# Patient Record
Sex: MALE | Race: WHITE | Employment: FULL TIME | URBAN - METROPOLITAN AREA
[De-identification: names, ages, dates, MRNs, and addresses within clinical notes are randomized per-mention and may not be internally consistent; named-entity substitution may affect disease eponyms.]

---

## 2022-01-18 ENCOUNTER — OFFICE VISIT (OUTPATIENT)
Dept: SURGERY | Age: 47
End: 2022-01-18

## 2022-01-18 VITALS
SYSTOLIC BLOOD PRESSURE: 140 MMHG | HEIGHT: 68 IN | HEART RATE: 85 BPM | TEMPERATURE: 99.1 F | DIASTOLIC BLOOD PRESSURE: 84 MMHG | WEIGHT: 188 LBS | BODY MASS INDEX: 28.49 KG/M2

## 2022-01-18 DIAGNOSIS — Z87.19 HISTORY OF GASTRITIS: ICD-10-CM

## 2022-01-18 DIAGNOSIS — Z86.010 HISTORY OF COLON POLYPS: ICD-10-CM

## 2022-01-18 DIAGNOSIS — Z12.11 ENCOUNTER FOR SCREENING COLONOSCOPY: Primary | ICD-10-CM

## 2022-01-18 PROCEDURE — 99202 OFFICE O/P NEW SF 15 MIN: CPT | Performed by: SURGERY

## 2022-01-18 RX ORDER — SODIUM, POTASSIUM,MAG SULFATES 17.5-3.13G
1 SOLUTION, RECONSTITUTED, ORAL ORAL ONCE
Qty: 1 EACH | Refills: 0 | Status: SHIPPED | OUTPATIENT
Start: 2022-01-18 | End: 2022-01-18

## 2022-02-05 ASSESSMENT — ENCOUNTER SYMPTOMS
VOMITING: 0
SHORTNESS OF BREATH: 0
ABDOMINAL PAIN: 0
BACK PAIN: 0
NAUSEA: 0
SORE THROAT: 0
TROUBLE SWALLOWING: 0
COUGH: 0
BLOOD IN STOOL: 0
CHOKING: 0

## 2022-02-06 NOTE — PROGRESS NOTES
Teri Zimmerman MD  General Surgery, Endoscopy  Chief Medical Officer    [facility] Breana Ji [address] [address]  Dept: [phone]  Fax: 90 [address]  Chief Complaint   Patient presents with    Colonoscopy     Patient had colonoscopy done 5 years ago at this hospital. Polyps found. Denies family hx of colon ca. Denies rectal bleeding, blood in stool, constipation or diarrhea. HPI     is a pleasant 64-year-old white male presenting for colonoscopy. March 2016 EGD and colonoscopy showing mild chronic gastritis, adenomatous polypectomies of the hepatic flexure, transverse and sigmoid colon, with tubulovillous rectal polypectomy. He has no significant GI complaints at this time. Reflux symptoms are self-limited, not requiring medication. Bowel movements are daily, formed, brown. Normal appetite. No recent weight changes, 188 pounds, BMI 29. No cough, fever nor other respiratory symptoms. No history of COVID-19, no vaccination. No family history of colon cancer nor polyps to his knowledge. Patient quit tobacco 2015. Review of Systems   Constitutional: Negative for activity change, appetite change, chills, fever and unexpected weight change. HENT: Negative for nosebleeds, sneezing, sore throat and trouble swallowing. Eyes: Negative for visual disturbance. Respiratory: Negative for cough, choking and shortness of breath. Cardiovascular: Negative for chest pain, palpitations and leg swelling. Gastrointestinal: Negative for abdominal pain, blood in stool, nausea and vomiting. Genitourinary: Negative for dysuria, flank pain and hematuria. Musculoskeletal: Positive for arthralgias. Negative for back pain, gait problem and myalgias. Allergic/Immunologic: Negative for immunocompromised state. Neurological: Negative for dizziness, seizures, syncope, weakness and headaches. Hematological: Does not bruise/bleed easily. Psychiatric/Behavioral: Negative for confusion and sleep disturbance. Past Medical History:   Diagnosis Date    DDD (degenerative disc disease), cervical     GERD (gastroesophageal reflux disease)     Hyperlipidemia     Osteoarthritis        Past Surgical History:   Procedure Laterality Date    COLON SURGERY      COLONOSCOPY  3/1/2016    LITHOTRIPSY      NECK SURGERY      UPPER GASTROINTESTINAL ENDOSCOPY      UPPER GASTROINTESTINAL ENDOSCOPY  3/1/2016    VASECTOMY         Family History   Problem Relation Age of Onset    High Cholesterol Father     Heart Disease Father     Diabetes Father     High Blood Pressure Father     Emphysema Mother     Other Mother         scleraderma    Osteoarthritis Mother     Heart Disease Brother     Heart Disease Brother        Allergies:  See list    Current Outpatient Medications   Medication Sig Dispense Refill    atorvastatin (LIPITOR) 20 MG tablet Take 1 tablet by mouth daily 90 tablet 3     No current facility-administered medications for this visit. Social History     Socioeconomic History    Marital status:      Spouse name: None    Number of children: None    Years of education: None    Highest education level: None   Occupational History    None   Tobacco Use    Smoking status: Former Smoker     Packs/day: 0.25     Years: 24.00     Pack years: 6.00     Types: Cigarettes    Smokeless tobacco: Never Used   Substance and Sexual Activity    Alcohol use:  Yes     Alcohol/week: 2.0 standard drinks     Types: 2 Cans of beer per week     Comment: beer    Drug use: No    Sexual activity: None   Other Topics Concern    None   Social History Narrative    None     Social Determinants of Health     Financial Resource Strain: Low Risk     Difficulty of Paying Living Expenses: Not hard at all   Food Insecurity: No Food Insecurity    Worried About Running Out of Food in the Last Year: Never true    Kristel of Food in the Last Year: Never true   Transportation Needs: No Transportation Needs    Lack of Transportation (Medical): No    Lack of Transportation (Non-Medical): No   Physical Activity: Insufficiently Active    Days of Exercise per Week: 3 days    Minutes of Exercise per Session: 30 min   Stress:     Feeling of Stress : Not on file   Social Connections:     Frequency of Communication with Friends and Family: Not on file    Frequency of Social Gatherings with Friends and Family: Not on file    Attends [employer] Services: Not on file    Active Member of Clubs or Organizations: Not on file    Attends Club or Organization Meetings: Not on file    Marital Status: Not on file   Intimate Partner Violence:     Fear of Current or Ex-Partner: Not on file    Emotionally Abused: Not on file    Physically Abused: Not on file    Sexually Abused: Not on file   Housing Stability:     Unable to Pay for Housing in the Last Year: Not on file    Number of [place] in the Last Year: Not on file    Unstable Housing in the Last Year: Not on file       BP (!) 140/84   Pulse 85   Temp 99.1 °F (37.3 °C)   Ht 5' 7.99\" (1.727 m)   Wt 188 lb (85.3 kg)   BMI 28.59 kg/m²      Physical Exam  Vitals and nursing note reviewed. Constitutional:       Appearance: He is well-developed. HENT:      Head: Normocephalic and atraumatic. Eyes:      General: No scleral icterus. Conjunctiva/sclera: Conjunctivae normal.      Pupils: Pupils are equal, round, and reactive to light. Neck:      Vascular: No JVD. Trachea: No tracheal deviation. Cardiovascular:      Rate and Rhythm: Normal rate and regular rhythm. Pulmonary:      Effort: Pulmonary effort is normal. No respiratory distress. Chest:      Chest wall: No tenderness. Abdominal:      General: There is no distension. Palpations: Abdomen is soft. There is no mass. Tenderness: There is no abdominal tenderness. There is no guarding or rebound.    Musculoskeletal: General: Normal range of motion. Cervical back: Normal range of motion and neck supple. Lymphadenopathy:      Cervical: No cervical adenopathy. Skin:     General: Skin is warm and dry. Findings: No erythema or rash. Neurological:      Mental Status: He is alert and oriented to person, place, and time. Cranial Nerves: No cranial nerve deficit. Psychiatric:         Behavior: Behavior normal.         Thought Content: Thought content normal.         Judgment: Judgment normal.         IMAGING/LABS    Surgical Pathology  Order: [id number]   Status: Final result     Visible to patient: Yes (seen)     Next appt: 11/04/2022 at 03:15 PM in Internal Medicine (Gina Mohamud, APRN - CNP)     0 Result Notes    Component 3/1/16 0932   Surgical Pathology Report (NOTE)   [id number]   [address], [facility] [address] [place], [address]   [phone]   Fax: [phone]     [facility]     Patient Name: Esterllita Palafox Med Rec: [id number]   Path Number: [id number]   Collected: 3/1/2016   Received: 3/2/2016   Reported: 3/3/2016 16:17     -- Diagnosis --   1.  STOMACH, ANTRUM, BIOPSIES:       -  MINIMAL CHRONIC INFLAMMATION.     -  NEGATIVE FOR ACTIVE GASTRITIS, INTESTINAL METAPLASIA OR   DYSPLASIA. 2.  TERMINAL ILEUM, BIOPSIES:       -  NORMAL COLONIC MUCOSA.             -  SMALL BOWEL MUCOSA NOT IDENTIFIED.       3.  COLON, HEPATIC FLEXURE, BIOPSIES:       -  ADENOMATOUS POLYP. 4.  TRANSVERSE COLON, BIOPSIES:       -  PORTIONS OF ADENOMATOUS POLYP AND SUBMUCOSAL ADIPOSE TISSUE,          CONSISTENT WITH LIPOMA. 5.  SIGMOID COLON, BIOPSIES:       -  PORTIONS OF ADENOMATOUS POLYP. 6.  RANDOM COLON, BIOPSIES:       -  NORMAL COLONIC MUCOSA.      7.  RECTUM, POLYPECTOMY AND BIOPSY:       -  TUBULOVILLOUS ADENOMA, MARGINS APPEAR NEGATIVE FOR DYSPLASIA.       -  SEPARATE FRAGMENT OF NORMAL COLONIC MUCOSA.         Franny Zacarias. Quan Harding M.D.   **Electronically Signed Out**         als/3/3/2016       Clinical Information   Pre-op Diagnosis:  ABD PAIN, EPIGASTRIC PAIN, LLQ PAIN, BLACK TARRY   STOOLS     Operative Findings:  ANTRAL BX; TERMINAL ILEUM BIOPSIES; HEPATIC   FLEXURE POLYP; TRANSVERSE COLON POLYP; SIGMOID COLON POLYP; RANDOM   COLON BIOPSIES; RECTAL POLYP x 2   Operation Performed:  EGD WITH BIOPSY; COLONOSCOPY WITH POLYPECTOMY ET   BIOPSY     Source of Specimen   1: ANTRAL BX   2: TERMINAL ILEUM BIOPSY   3: HEPATIC FLEXURE POLYP   4: TRANSVERSE COLON POLYP   5: SIGMOID COLON POLYP X2   6: RANDOM COLON BIOPSIES   7: RECTAL POLYP X2     Gross Description   1.  \"FRANTZ IRWIN, ANTRAL BX\" Two tan to tan-brown tissue fragments   measuring 0.2 and 0.5 cm and measures in aggregate 0.7 x 0.2 x 0.1 cm. Entirely submitted in 1cs.     2.  \"FRANTZ IRWIN, TERMINAL ILEUM BX\" Two pale tan tissue fragments   measuring 0.5 and 0.7 cm and measures in aggregate 1.2 x 0.4 x < 0.1   cm.  Entirely submitted in 1cs. 3.  \"FRANTZ IRWIN, HEPATIC FLEXURE POLYP\" Single tan to pink-tan tissue   fragment measuring 0.4 x 0.3 x 0.2 cm.  Entirely submitted in 1cs. 4.  \"FRANTZ IRWIN, TRANSVERSE COLON POLYP\" Multiple tan to pink-tan   tissue fragments ranging from 0.1 to 0.5 cm and measures in aggregate   2.0 x 0.8 x 0.2 cm.  Entirely submitted in 1cs. 5.  \"FRANTZ PARDOH, SIGMOID COLON POLYP x 2\" Five pale tan to tan tissue   fragments ranging from 0.3 to 0.9 cm and measures in aggregate 2.5 x   0.2 x 0.1 cm.  Entirely submitted in 1cs. 6.  \"FRANTZ IRWIN, RANDOM COLON BIOPSIES\" Three pale to yellow-tan   tissue fragments measuring from 0.3 to 0.6 cm and measures in   aggregate 1.4 x 0.2 x < 0.1 cm.  Entirely submitted in 1cs. 7.  \"FRANTZ PARDOH, RECTAL POLYP\" Two tan to tan-brown tissue fragments   measuring 0.3 x 0.2 x 0.2 cm and the other is pedunculated and has a   stalk and measures 1.3 x 0.8 x 0.5 cm, inked black, bisected.    Entirely submitted in 1cs.  yr tm       Microscopic Description   1. Sections of gastric mucosa show lamina propria with minimal   lymphocytic inflammation. Doug Conradi is no evidence of acute inflammation,   intestinal metaplasia or dysplasia. Doug Conradi is no evidence of organisms   suspicious for Helicobacter on the routine H&E stain. 2. Sections of colon mucosa show linear arrangement of the colonic   crypts and the usual lymphocytes and plasma cells. Doug Conradi is no   evidence of crypt distortion, acute inflammation, dysplasia or   malignancy.  Small bowel mucosa is not identified. 3. Sections of colonic mucosa show crowded tubular shaped glands lined   by hyperchromatic columnar epithelial cells. Doug Conradi is no evidence of   high grade dysplasia or invasive malignancy.     4. Sections of colonic mucosa show crowded tubular shaped glands lined   by hyperchromatic columnar epithelial cells. Doug Conradi is no evidence of   high grade dysplasia or invasive malignancy.  Some fragments also   contain submucosal adipose tissue. 5. Sections of colonic mucosa show crowded tubular shaped glands lined   by hyperchromatic columnar epithelial cells. Doug Conradi is no evidence of   high grade dysplasia or invasive malignancy.     6. Sections of colon mucosa show linear arrangement of the colonic   crypts and the usual lymphocytes and plasma cells.  There is no   evidence of crypt distortion, acute inflammation, dysplasia or   malignancy.     7. Sections of colonic mucosa show crowded tubular and villous shaped   glands lined by hyperchromatic columnar epithelial cells. Doug Conradi is no   evidence of high grade dysplasia or invasive malignancy.  The largest   polyp shows the cauterized margin to be negative for dysplasia.  A   separate fragment shows normal-appearing colonic mucosa.     TR-0               ASSESSMENT     Diagnosis Orders   1. Encounter for screening colonoscopy     2. History of colon polyps     3. History of gastritis     4.  BMI 28.0-28.9,adult         PLAN    Discussed at length with  his history of multiple colon polyps in 2016 with tubulovillous rectal polypectomy, etc.  No GI complaints at this time. We will proceed with colonoscopy.   Risks, benefits, alternatives thoroughly reviewed and accepted by , including GI bleeding, perforation, missed lesions, COVID-19 exposure/infection, etc.  Discussed importance of a healthy, balanced high-fiber low-fat diet with fiber supplementation, increased water intake and physical activity, decrease calories and sugar, etc.     Roseann Cook MD

## 2022-02-06 NOTE — PATIENT INSTRUCTIONS
Patient Education        Learning About Colonoscopy  What is a colonoscopy? A colonoscopy is a test (also called a procedure) that lets a doctor look inside your large intestine. The doctor uses a thin, lighted tube called a colonoscope. The doctor uses it to look for small growths called polyps, colon or rectal cancer (colorectal cancer), or other problems like bleeding. During the procedure, the doctor can take samples of tissue. The samples can then be checked for cancer or other conditions. The doctor can also take out polyps. How is a colonoscopy done? This procedure is done in a doctor's office or a clinic or hospital. You will get medicine to help you relax and not feel pain. Some people find that they don't remember having the test because of the medicine. The doctor gently moves the colonoscope, or scope, through the colon. The scope is also a small video camera. It lets the doctor see the colon and take pictures. How do you prepare for the procedure? You need to clean out your colon before the procedure so the doctor can see your colon. This depends on which \"colon prep\" your doctor recommends. To clean out your colon, you'll do a \"colon prep\" before the test. This means you stop eating solid foods and drink only clear liquids. You can have water, tea, coffee, clear juices, clear broths, flavored ice pops, and gelatin (such as Jell-O). Do not drink anything red or purple. The day or night before the procedure, you drink a large amount of a special liquid. This causes loose, frequent stools. You will go to the bathroom a lot. Your doctor may have you drink part of the liquid the evening before and the rest on the day of the test. It's very important to drink all of the liquid. If you have problems drinking it, call your doctor. Arrange to have someone take you home after the test.  What can you expect after a colonoscopy?   Your doctor will tell you when you can eat and do your usual activities. Drink a lot of fluid after the test to replace the fluids you may have lost during the colon prep. But don't drink alcohol. Your doctor will talk to you about when you'll need your next colonoscopy. The results of your test and your risk for colorectal cancer will help your doctor decide how often you need to be checked. After the test, you may be bloated or have gas pains. You may need to pass gas. If a biopsy was done or a polyp was removed, you may have streaks of blood in your stool (feces) for a few days. Check with your doctor to see when it is safe to take aspirin and nonsteroidal anti-inflammatory drugs (NSAIDs) again. Problems such as heavy rectal bleeding may not occur until several weeks after the test. This isn't common. But it can happen after polyps are removed. Follow-up care is a key part of your treatment and safety. Be sure to make and go to all appointments, and call your doctor if you are having problems. It's also a good idea to know your test results and keep a list of the medicines you take. Where can you learn more? Go to [email or web address] org and sign in to your [employer] account. Enter Z429 in the [facility] box to learn more about \"Learning About Colonoscopy. \"     If you do not have an account, please click on the \"Sign Up Now\" link. Current as of: September 8, 2021               Content Version: 13.1  © 6976-5644 Healthwise, Incorporated. Care instructions adapted under license by [facility] ([facility]). If you have questions about a medical condition or this instruction, always ask your healthcare professional. [address] any warranty or liability for your use of this information.

## 2022-05-18 ENCOUNTER — TELEPHONE (OUTPATIENT)
Dept: SURGERY | Age: 47
End: 2022-05-18

## 2022-05-18 NOTE — TELEPHONE ENCOUNTER
Per Shahida Tineo, spoke with patient informed colonoscopy scheduled for 5/17/22 with Dr. Lise Martinez cancelled. Patient would like referral faxed to Dr. Sabrina Norman.

## 2022-06-29 ENCOUNTER — TELEPHONE (OUTPATIENT)
Dept: GASTROENTEROLOGY | Age: 47
End: 2022-06-29

## 2022-06-29 NOTE — PROGRESS NOTES
Patient states they received their colon prep instructions and home medications that are to be taken on the day of their procedure with a small sip of water only, from the physician's office.

## 2022-06-29 NOTE — TELEPHONE ENCOUNTER
Patient scheduled for July 8, Colonoscopy. Instructed patient to  Prep instructions at the office. Faxed Surgery Scheduling form to Surgery. Patient has prep previously ordered from Dr Merlin Erm.

## 2022-07-07 ENCOUNTER — ANESTHESIA EVENT (OUTPATIENT)
Dept: OPERATING ROOM | Age: 47
End: 2022-07-07
Payer: COMMERCIAL

## 2022-07-08 ENCOUNTER — ANESTHESIA (OUTPATIENT)
Dept: OPERATING ROOM | Age: 47
End: 2022-07-08
Payer: COMMERCIAL

## 2022-07-08 ENCOUNTER — HOSPITAL ENCOUNTER (OUTPATIENT)
Age: 47
Setting detail: OUTPATIENT SURGERY
Discharge: HOME | End: 2022-07-08
Attending: INTERNAL MEDICINE | Admitting: INTERNAL MEDICINE
Payer: COMMERCIAL

## 2022-07-08 VITALS
HEART RATE: 53 BPM | SYSTOLIC BLOOD PRESSURE: 120 MMHG | WEIGHT: 174 LBS | HEIGHT: 68 IN | RESPIRATION RATE: 17 BRPM | TEMPERATURE: 97.3 F | DIASTOLIC BLOOD PRESSURE: 73 MMHG | BODY MASS INDEX: 26.37 KG/M2 | OXYGEN SATURATION: 97 %

## 2022-07-08 DIAGNOSIS — Z12.11 SCREENING FOR COLON CANCER: ICD-10-CM

## 2022-07-08 DIAGNOSIS — K63.89 CECUM MASS: Primary | ICD-10-CM

## 2022-07-08 PROCEDURE — 45385 COLONOSCOPY W/LESION REMOVAL: CPT | Performed by: INTERNAL MEDICINE

## 2022-07-08 PROCEDURE — 6360000002 HC RX W HCPCS: Performed by: NURSE ANESTHETIST, CERTIFIED REGISTERED

## 2022-07-08 PROCEDURE — 2500000003 HC RX 250 WO HCPCS: Performed by: NURSE ANESTHETIST, CERTIFIED REGISTERED

## 2022-07-08 PROCEDURE — 88305 TISSUE EXAM BY PATHOLOGIST: CPT

## 2022-07-08 PROCEDURE — 3700000000 HC ANESTHESIA ATTENDED CARE: Performed by: INTERNAL MEDICINE

## 2022-07-08 PROCEDURE — 45380 COLONOSCOPY AND BIOPSY: CPT | Performed by: INTERNAL MEDICINE

## 2022-07-08 PROCEDURE — 7100000010 HC PHASE II RECOVERY - FIRST 15 MIN: Performed by: INTERNAL MEDICINE

## 2022-07-08 PROCEDURE — 3700000001 HC ADD 15 MINUTES (ANESTHESIA): Performed by: INTERNAL MEDICINE

## 2022-07-08 PROCEDURE — 2709999900 HC NON-CHARGEABLE SUPPLY: Performed by: INTERNAL MEDICINE

## 2022-07-08 PROCEDURE — 3609010600 HC COLONOSCOPY POLYPECTOMY SNARE/COLD BIOPSY: Performed by: INTERNAL MEDICINE

## 2022-07-08 PROCEDURE — 2500000003 HC RX 250 WO HCPCS: Performed by: INTERNAL MEDICINE

## 2022-07-08 PROCEDURE — 7100000011 HC PHASE II RECOVERY - ADDTL 15 MIN: Performed by: INTERNAL MEDICINE

## 2022-07-08 PROCEDURE — 2580000003 HC RX 258: Performed by: NURSE ANESTHETIST, CERTIFIED REGISTERED

## 2022-07-08 RX ORDER — SODIUM CHLORIDE, SODIUM LACTATE, POTASSIUM CHLORIDE, CALCIUM CHLORIDE 600; 310; 30; 20 MG/100ML; MG/100ML; MG/100ML; MG/100ML
INJECTION, SOLUTION INTRAVENOUS CONTINUOUS
Status: DISCONTINUED | OUTPATIENT
Start: 2022-07-08 | End: 2022-07-08 | Stop reason: HOSPADM

## 2022-07-08 RX ORDER — PROPOFOL 10 MG/ML
INJECTION, EMULSION INTRAVENOUS CONTINUOUS PRN
Status: DISCONTINUED | OUTPATIENT
Start: 2022-07-08 | End: 2022-07-08 | Stop reason: SDUPTHER

## 2022-07-08 RX ORDER — LIDOCAINE HYDROCHLORIDE 20 MG/ML
INJECTION, SOLUTION EPIDURAL; INFILTRATION; INTRACAUDAL; PERINEURAL PRN
Status: DISCONTINUED | OUTPATIENT
Start: 2022-07-08 | End: 2022-07-08 | Stop reason: SDUPTHER

## 2022-07-08 RX ADMIN — PROPOFOL 250 MCG/KG/MIN: 10 INJECTION, EMULSION INTRAVENOUS at 09:00

## 2022-07-08 RX ADMIN — SODIUM CHLORIDE, POTASSIUM CHLORIDE, SODIUM LACTATE AND CALCIUM CHLORIDE: 600; 310; 30; 20 INJECTION, SOLUTION INTRAVENOUS at 07:44

## 2022-07-08 RX ADMIN — LIDOCAINE HYDROCHLORIDE 5 ML: 20 INJECTION, SOLUTION EPIDURAL; INFILTRATION; INTRACAUDAL; PERINEURAL at 09:00

## 2022-07-08 ASSESSMENT — LIFESTYLE VARIABLES: SMOKING_STATUS: 0

## 2022-07-08 NOTE — PROGRESS NOTES

## 2022-07-08 NOTE — ANESTHESIA POSTPROCEDURE EVALUATION
Department of Anesthesiology  Postprocedure Note    Patient: Gerard Holman  MRN: [id number]  YOB: 1975  Date of evaluation: 7/8/2022      Procedure Summary     Date: 07/08/22 Room / Location: [address] [place] [address]    Anesthesia Start: 7795 Anesthesia Stop: 0079    Procedure: COLONOSCOPY POLYPECTOMY SNARE/COLD BIOPSY  with spot tattoe, and eleview. (N/A Abdomen) Diagnosis:       Screening for colon cancer      (SCREENING)    Surgeons: Arden Arredondo MD Responsible Provider: MAYELIN Edgar CRNA    Anesthesia Type: general, TIVA ASA Status: 2          Anesthesia Type: No value filed.     Yariel Phase I:      Yariel Phase II: Yariel Score: 10      Anesthesia Post Evaluation    Patient location during evaluation: PACU  Patient participation: complete - patient participated  Level of consciousness: awake and alert  Pain score: 0  Airway patency: patent  Nausea & Vomiting: no nausea and no vomiting  Complications: no  Cardiovascular status: blood pressure returned to baseline  Respiratory status: acceptable and room air  Hydration status: stable

## 2022-07-08 NOTE — ANESTHESIA PRE PROCEDURE
Department of Anesthesiology  Preprocedure Note       Name:  Apolinar Bhat   Age:  50 y.o.  :  1975                                          MRN:  [id number]         Date:  2022      Surgeon: Rene Mendez):  Keaton Mendez MD    Procedure: Procedure(s):  COLORECTAL CANCER SCREENING, NOT HIGH RISK    Medications prior to admission:   Prior to Admission medications    Medication Sig Start Date End Date Taking? Authorizing Provider   atorvastatin (LIPITOR) 20 MG tablet Take 1 tablet by mouth daily 21   MAYELIN Smith CNP       Current medications:    Current Facility-Administered Medications   Medication Dose Route Frequency Provider Last Rate Last Admin    lactated ringers infusion   IntraVENous Continuous MAYELIN Gutierrez CRNA 100 mL/hr at 22 0744 New Bag at 22 0744       Allergies: Allergies   Allergen Reactions    Ampicillin     Tetracyclines & Related        Problem List:    Patient Active Problem List   Diagnosis Code    Gastroesophageal reflux disease without esophagitis K21.9    Abdominal pain R10.9    Blood in stool, cristobal K92.1       Past Medical History:        Diagnosis Date    DDD (degenerative disc disease), cervical     GERD (gastroesophageal reflux disease)     Hyperlipidemia     Osteoarthritis        Past Surgical History:        Procedure Laterality Date    COLON SURGERY      COLONOSCOPY  3/1/2016    LITHOTRIPSY      NECK SURGERY      UPPER GASTROINTESTINAL ENDOSCOPY      UPPER GASTROINTESTINAL ENDOSCOPY  3/1/2016    VASECTOMY         Social History:    Social History     Tobacco Use    Smoking status: Former Smoker     Packs/day: 0.25     Years: 24.00     Pack years: 6.00     Types: Cigarettes    Smokeless tobacco: Never Used   Substance Use Topics    Alcohol use:  Yes     Alcohol/week: 2.0 standard drinks     Types: 2 Cans of beer per week     Comment: beer                                Counseling given: Not Answered      Vital Signs (Current):   Vitals:    06/29/22 1416 07/08/22 0736   BP:  118/65   Pulse:  68   Resp:  18   Temp:  36.8 °C (98.3 °F)   TempSrc:  Temporal   SpO2:  97%   Weight: 185 lb (83.9 kg) 174 lb (78.9 kg)   Height: 5' 8\" (1.727 m)                                               BP Readings from Last 3 Encounters:   07/08/22 118/65   01/18/22 (!) 140/84   11/04/21 122/73       NPO Status: Time of last liquid consumption: 0200                        Time of last solid consumption: 1830                        Date of last liquid consumption: 07/08/22                        Date of last solid food consumption: 07/06/22    BMI:   Wt Readings from Last 3 Encounters:   07/08/22 174 lb (78.9 kg)   01/18/22 188 lb (85.3 kg)   11/04/21 183 lb (83 kg)     Body mass index is 26.46 kg/m². CBC:   Lab Results   Component Value Date/Time    WBC 8.1 10/23/2020 12:00 AM    RBC 5.51 10/23/2020 12:00 AM    HGB 16.6 10/23/2020 12:00 AM    HCT 48.9 10/23/2020 12:00 AM    MCV 89 10/23/2020 12:00 AM    RDW 14.0 02/22/2016 12:30 PM     10/23/2020 12:00 AM       CMP:   Lab Results   Component Value Date/Time     04/24/2021 12:00 AM    K 4.4 04/24/2021 12:00 AM     04/24/2021 12:00 AM    CO2 26 10/23/2020 12:00 AM    BUN 17 03/29/2018 12:00 AM    CREATININE 1.10 04/24/2021 12:00 AM    GFRAA >60 02/22/2016 12:30 PM    LABGLOM >60 10/23/2020 12:00 AM    LABGLOM >60 02/22/2016 12:30 PM    GLUCOSE 101 10/23/2020 12:00 AM    PROT 6.9 04/24/2021 12:00 AM    CALCIUM 9.0 04/24/2021 12:00 AM    BILITOT 0.9 04/24/2021 12:00 AM    ALKPHOS 77 04/24/2021 12:00 AM    AST 20 04/24/2021 12:00 AM    ALT 25 04/24/2021 12:00 AM       POC Tests: No results for input(s): POCGLU, POCNA, POCK, POCCL, POCBUN, POCHEMO, POCHCT in the last 72 hours.     Coags: No results found for: PROTIME, INR, APTT    HCG (If Applicable): No results found for: PREGTESTUR, PREGSERUM, HCG, HCGQUANT     ABGs: No results found for: PHART, PO2ART, NXT5JKA, LXQ3GRH, BEART, M2NNZOWV     Type & Screen (If Applicable):  No results found for: LABABO, LABRH    Drug/Infectious Status (If Applicable):  No results found for: HIV, HEPCAB    COVID-19 Screening (If Applicable): No results found for: COVID19        Anesthesia Evaluation  Patient summary reviewed and Nursing notes reviewed no history of anesthetic complications:   Airway: Mallampati: II  TM distance: >3 FB   Neck ROM: full  Mouth opening: > = 3 FB   Dental:          Pulmonary:Negative Pulmonary ROS and normal exam        (-) not a current smoker                           Cardiovascular:    (+) hyperlipidemia                  Neuro/Psych:   Negative Neuro/Psych ROS              GI/Hepatic/Renal:   (+) bowel prep,           Endo/Other: Negative Endo/Other ROS                    Abdominal:             Vascular: negative vascular ROS. Other Findings:           Anesthesia Plan      general and TIVA     ASA 2       Induction: intravenous. Anesthetic plan and risks discussed with patient and spouse.                         MAYELIN Pradhan CRNA   7/8/2022

## 2022-07-08 NOTE — OP NOTE
[facility]    COLONOSCOPY    PROCEDURE DATE: 07/08/22    REFERRING PHYSICIAN: No ref. provider found     PRIMARY CARE PROVIDER: MAYELIN Dobbs - CNP    ATTENDING PHYSICIAN: Maryam Richardson MD     HISTORY: Mr. Vamsi De Leon is a 50 y.o. male who presents to the  endoscopy unit for colonoscopy. The patient's clinical history is remarkable for acid reflux. He is currently medically stable and appropriate for the planned procedure. PREOPERATIVE DIAGNOSIS: screening for colon cancer. PROCEDURES:   Transanal Colonoscopy --screening. POSTPROCEDURE DIAGNOSIS:  4cm sessile polypoid lesion on cecal fold. MEDICATIONS: MAC per anesthesia     EBL 4ml    INSTRUMENT: Olympus CF-H190 AL Pediatric flexible Colonoscope. PREPARATION: The nature and character of the procedure as well as risks, benefits, and alternatives were discussed with the patient and informed consent was obtained. Complications were said to include, but were not limited to: medication allergy, medication reaction, cardiovascular and respiratory problems, bleeding, perforation, infection, and/or missed diagnosis. Following arrival in the endoscopy room, the patient was placed in the left lateral decubitus position and final time-out accomplished in the presence of the nursing staff. Baseline vital signs were obtained and reviewed, and IV sedation was subsequently initiated. FINDINGS: Rectal examination demonstrated no significant visible external abnormality and digital palpation was unremarkable. Following adequate conscious sedation the colonoscope was introduced and advanced under direct visualization to the cecum, which was identified by the appendiceal orifice. The bowel preparation was felt to be suboptimal. This included  yellow pasty stool on colon mucosa throughout that was moderately washed on irigation and aspiration. Cecal intubation time was 6 minutes.      Once maximally inserted, the endoscope was withdrawn and the mucosa was carefully inspected. The mucosal exam revealed large 5mm sessile polyp in cecum was removed with a hot snare. A 3 cm polypoid lesion on the cecal fold was noted. It was injected at the base with 1cc of Eleview to raise the mucosa. 1cc of Spot ink was injected distal to the fold. A hot snare was used to attempt removal of the polypoid mass in piece meal - it was extensive and not amenable to removal which was discontinued. A 5mm sessile polyp was removed from the descending colon with cold biopsy forceps. Moderate sigmoid diverticulosis was noted. A 4mm rectal polyp was removed with cold biopsy forceps. Retroflexion was performed in the rectum and no internal hemorrhoids were noted. Withdrawal time was 37 minutes. IMPRESSION:   1. Cecal polypoid sessile lesion - approximately 3cm  2. Ascending colon polyps  3. Descending colon polyp  4. Diverticulosis  5. Rectal polyp    RECOMMENDATIONS:   1) Follow up with General surgery   2) Repeat Colonoscopy in no later than 1 year  3) Recommend extended prep for future colonoscopy      Electronically signed by Prabhu Duong MD on 7/8/2022 at 9:49 AM     The patient was counseled at length about the risks of flavio Covid-19 during their perioperative period and any recovery window from their procedure. The patient was made aware that flavio Covid-19  may worsen their prognosis for recovering from their procedure  and lend to a higher morbidity and/or mortality risk. All material risks, benefits, and reasonable alternatives including postponing the procedure were discussed. The patient DOES wish to proceed with the procedure at this time.

## 2022-07-08 NOTE — H&P
History and Physical    Patient's Name/Date of Birth: Oliverio Michaud / 1975 (9 y.o.)    MRN: [id number]     Date: July 8, 2022       CHIEF COMPLAINT:  screening for colon cancer      Mr. Marvel Kocher is a 68-year-old man with a past medical history of acid reflux and hyperlipidemia who presents for colon cancer screening. He denies any weight loss, abdominal pain, rectal bleeding, family history of colon cancer. Past Medical History:   Diagnosis Date    DDD (degenerative disc disease), cervical     GERD (gastroesophageal reflux disease)     Hyperlipidemia     Osteoarthritis      Past Surgical History:   Procedure Laterality Date    COLON SURGERY      COLONOSCOPY  3/1/2016    LITHOTRIPSY      NECK SURGERY      UPPER GASTROINTESTINAL ENDOSCOPY      UPPER GASTROINTESTINAL ENDOSCOPY  3/1/2016    VASECTOMY       Current Facility-Administered Medications   Medication Dose Route Frequency Provider Last Rate Last Admin    lactated ringers infusion   IntraVENous Continuous MAYELIN Barger CRNA 100 mL/hr at 07/08/22 0744 New Bag at 07/08/22 0744     Allergies   Allergen Reactions    Ampicillin     Tetracyclines & Related      Family History   Problem Relation Age of Onset    High Cholesterol Father     Heart Disease Father     Diabetes Father     High Blood Pressure Father     Emphysema Mother     Other Mother         scleraderma    Osteoarthritis Mother     Heart Disease Brother     Heart Disease Brother      Social History     Socioeconomic History    Marital status:      Spouse name: Not on file    Number of children: Not on file    Years of education: Not on file    Highest education level: Not on file   Occupational History    Not on file   Tobacco Use    Smoking status: Former Smoker     Packs/day: 0.25     Years: 24.00     Pack years: 6.00     Types: Cigarettes    Smokeless tobacco: Never Used   Substance and Sexual Activity    Alcohol use:  Yes     Alcohol/week: 2.0 standard drinks     Types: 2 Cans of beer per week     Comment: beer    Drug use: No    Sexual activity: Not on file   Other Topics Concern    Not on file   Social History Narrative    Not on file     Social Determinants of Health     Financial Resource Strain: Low Risk     Difficulty of Paying Living Expenses: Not hard at all   Food Insecurity: No Food Insecurity    Worried About Running Out of Food in the Last Year: Never true    Kristel of Food in the Last Year: Never true   Transportation Needs: No Transportation Needs    Lack of Transportation (Medical): No    Lack of Transportation (Non-Medical): No   Physical Activity: Insufficiently Active    Days of Exercise per Week: 3 days    Minutes of Exercise per Session: 30 min   Stress:     Feeling of Stress : Not on file   Social Connections:     Frequency of Communication with Friends and Family: Not on file    Frequency of Social Gatherings with Friends and Family: Not on file    Attends Orthodoxy Services: Not on file    Active Member of Clubs or Organizations: Not on file    Attends Club or Organization Meetings: Not on file    Marital Status: Not on file   Intimate Partner Violence:     Fear of Current or Ex-Partner: Not on file    Emotionally Abused: Not on file    Physically Abused: Not on file    Sexually Abused: Not on file   Housing Stability:     Unable to Pay for Housing in the Last Year: Not on file    Number of [place] in the Last Year: Not on file    Unstable Housing in the Last Year: Not on file     ROS:See HPI    Physical Exam:  Vitals:    07/08/22 0736   BP: 118/65   Pulse: 68   Resp: 18   Temp: 98.3 °F (36.8 °C)   SpO2: 97%       Chest: Breath sounds were clear and equal with no rales, wheezes, or rhonchi. Respiratory effort was normal with no retractions or use of accessory muscles. Cardiovascular: Heart sounds were normal with a regular rate and rhythm. There were no murmurs, gallops or rubs. Abdomen:   Bowel sounds were normal.  The abdomen was soft and non distended. There was no tenderness, guarding, rebound, or rigidity. There were no masses, hepatosplenomegaly, or hernias. Assessment:  Mr. Rm Free a 68-year-old man with a past medical history of acid reflux and hyperlipidemia who presents for colon cancer screening. ASA 2 Mallampati score 2    Plan:  1  Colonoscopy  2. Patient recommendations based on findings    VERIFICATION OF CONSENT    The patient was counseled regarding the procedure, its indications, risks, potential complications and alternatives. Any questions were answered.  Consent was obtained    Electronically signed by Radha Paredes MD on 7/8/2022 at 8:02 AM

## 2022-07-11 ENCOUNTER — TELEPHONE (OUTPATIENT)
Dept: GASTROENTEROLOGY | Age: 47
End: 2022-07-11

## 2022-07-11 NOTE — TELEPHONE ENCOUNTER
I called Dr. Silva Mcleod office and informed them that Dr. Aleksandra Burns has spoke with Dr. Michael Stallings regarding [employer]. He needs to be scheduled in the office regarding a mass in the cecum. All information faxed to them. She said they will call to schedule. I asked that they let us know when he gets scheduled.

## 2022-07-12 LAB — SURGICAL PATHOLOGY REPORT: NORMAL

## 2022-11-09 NOTE — TELEPHONE ENCOUNTER
We received another referral for patient to schedule screening colonoscopy, would it be okay to schedule this? Please advise, thank you.

## 2023-10-03 ENCOUNTER — OFFICE VISIT (OUTPATIENT)
Dept: GASTROENTEROLOGY | Age: 48
End: 2023-10-03
Payer: COMMERCIAL

## 2023-10-03 ENCOUNTER — HOSPITAL ENCOUNTER (OUTPATIENT)
Age: 48
Discharge: HOME | End: 2023-10-03
Payer: COMMERCIAL

## 2023-10-03 VITALS
HEART RATE: 81 BPM | DIASTOLIC BLOOD PRESSURE: 70 MMHG | SYSTOLIC BLOOD PRESSURE: 113 MMHG | WEIGHT: 182.5 LBS | TEMPERATURE: 97.7 F | HEIGHT: 68 IN | RESPIRATION RATE: 18 BRPM | BODY MASS INDEX: 27.66 KG/M2

## 2023-10-03 DIAGNOSIS — Z01.818 PRE-OP TESTING: ICD-10-CM

## 2023-10-03 DIAGNOSIS — D12.6 TUBULOVILLOUS ADENOMA POLYP OF COLON: Primary | ICD-10-CM

## 2023-10-03 DIAGNOSIS — Z90.49 STATUS POST RIGHT HEMICOLECTOMY: ICD-10-CM

## 2023-10-03 LAB
EKG ATRIAL RATE: 70 BPM
EKG P AXIS: 42 DEGREES
EKG P-R INTERVAL: 184 MS
EKG Q-T INTERVAL: 394 MS
EKG QRS DURATION: 88 MS
EKG QTC CALCULATION (BAZETT): 425 MS
EKG R AXIS: -3 DEGREES
EKG T AXIS: 34 DEGREES
EKG VENTRICULAR RATE: 70 BPM

## 2023-10-03 PROCEDURE — 99213 OFFICE O/P EST LOW 20 MIN: CPT | Performed by: INTERNAL MEDICINE

## 2023-10-03 PROCEDURE — 93005 ELECTROCARDIOGRAM TRACING: CPT

## 2023-10-03 PROCEDURE — 93010 ELECTROCARDIOGRAM REPORT: CPT | Performed by: INTERNAL MEDICINE

## 2023-10-03 RX ORDER — M-VIT,TX,IRON,MINS/CALC/FOLIC 27MG-0.4MG
1 TABLET ORAL DAILY
COMMUNITY

## 2023-10-03 RX ORDER — POLYETHYLENE GLYCOL 3350, SODIUM CHLORIDE, POTASSIUM CHLORIDE, SODIUM BICARBONATE, AND SODIUM SULFATE 240; 5.84; 2.98; 6.72; 22.72 G/4L; G/4L; G/4L; G/4L; G/4L
4000 POWDER, FOR SOLUTION ORAL ONCE
Qty: 4000 ML | Refills: 0 | Status: SHIPPED | OUTPATIENT
Start: 2023-10-03 | End: 2023-10-03

## 2023-10-03 NOTE — PROGRESS NOTES
[address]    Chief Complaint   Patient presents with    Follow-up     One year follow up rectal mass-surgery to remove. Denies any current GI issues. Pre-visit colonoscopy. HPI    Mr. Caitie Clifton is a 46year-old man with a past medical history of acid reflux and hyperlipidemia whose colonoscopy on 07/08/2022 showed a lesion in her IV valve which led to a referral to colorectal surgery. He had a right hemicolectomy with an appendectomy at an outside hospital in 08/2022. He presents for a follow-up colonoscopy. Has added fiber to diet  No longer smoking tobacco    Family history of colon cancer: No  Blood in stool: No  Unintentional weight loss: No  Abdominal pain: No  Prior colonoscopy: Yes - 2022. S/P right hemicolectomy. 07/08/2023 Colonoscopy  1. Cecal polypoid sessile lesion - approximately 3cm  2. Ascending colon polyps  3. Descending colon polyp  4. Diverticulosis  5. Rectal polyp     RECOMMENDATIONS:   1) Follow up with General surgery   2) Repeat Colonoscopy in no later than 1 year  3) Recommend extended prep for future colonoscopy    08/26/2022 Pathology  Right hemicolectomy: Tubulovillous adenoma (1.9 x1.4 x 1.0 cm) at the IC valve       Past Medical History:   Diagnosis Date    Colon cancer ([address]) 07/2022    Dr. Aubrey Alva, Dr. [facility]    DDD (degenerative disc disease), cervical     GERD (gastroesophageal reflux disease)     Hyperlipidemia     Osteoarthritis          Past Surgical History:   Procedure Laterality Date    COLON SURGERY      COLONOSCOPY  03/01/2016    COLONOSCOPY N/A 07/08/2022    COLONOSCOPY POLYPECTOMY SNARE/COLD BIOPSY  with spot tattoe, and eleview.  performed by Chen Pearce MD at [address]  07/08/2022    Dr Fitch-polyps cecum(tubular adenoma),ascending colon(tubular adenoma)rectum(hyperplastic),diverticulosis    LITHOTRIPSY      NECK SURGERY      RIGHT COLECTOMY  08/26/2022    [facility]-Dr.Alammar HEATH

## 2023-10-03 NOTE — PATIENT INSTRUCTIONS
SURVEY:    You may be receiving a survey from [employer] regarding your visit today. Please complete the survey to enable us to provide the highest quality of care to you and your family. If you cannot score us a very good on any question, please call the office to discuss how we could have made your experience a very good one. Thank you.

## 2023-10-03 NOTE — PROGRESS NOTES
Patient states they received their endoscopy prep instructions from the physician's office. Patient states they understand medications to be taken with sip of water only the a.m. of procedure. Results of bowel prep reviewed with patient, appearance should be clear, yellow, liquid prior to arrival at facility. Patient verbalizes understanding and denies any questions at this time.

## 2023-10-03 NOTE — H&P (VIEW-ONLY)
[address]    Chief Complaint   Patient presents with    Follow-up     One year follow up rectal mass-surgery to remove. Denies any current GI issues. Pre-visit colonoscopy. HPI    Mr. Avis Maxwell is a 46year-old man with a past medical history of acid reflux and hyperlipidemia whose colonoscopy on 07/08/2022 showed a lesion in her IV valve which led to a referral to colorectal surgery. He had a right hemicolectomy with an appendectomy at an outside hospital in 08/2022. He presents for a follow-up colonoscopy. Has added fiber to diet  No longer smoking tobacco    Family history of colon cancer: No  Blood in stool: No  Unintentional weight loss: No  Abdominal pain: No  Prior colonoscopy: Yes - 2022. S/P right hemicolectomy. 07/08/2023 Colonoscopy  1. Cecal polypoid sessile lesion - approximately 3cm  2. Ascending colon polyps  3. Descending colon polyp  4. Diverticulosis  5. Rectal polyp     RECOMMENDATIONS:   1) Follow up with General surgery   2) Repeat Colonoscopy in no later than 1 year  3) Recommend extended prep for future colonoscopy    08/26/2022 Pathology  Right hemicolectomy: Tubulovillous adenoma (1.9 x1.4 x 1.0 cm) at the IC valve       Past Medical History:   Diagnosis Date    Colon cancer ([address]) 07/2022    Dr. Katie Montoya, Dr. Evangelina Nicole [facility]    DDD (degenerative disc disease), cervical     GERD (gastroesophageal reflux disease)     Hyperlipidemia     Osteoarthritis          Past Surgical History:   Procedure Laterality Date    COLON SURGERY      COLONOSCOPY  03/01/2016    COLONOSCOPY N/A 07/08/2022    COLONOSCOPY POLYPECTOMY SNARE/COLD BIOPSY  with spot tattoe, and eleview.  performed by Micha Gusman MD at [address]  07/08/2022    Dr Fitch-polyps cecum(tubular adenoma),ascending colon(tubular adenoma)rectum(hyperplastic),diverticulosis    LITHOTRIPSY      NECK SURGERY      RIGHT COLECTOMY  08/26/2022    [facility]-Dr.Alammar HEATH effort is normal.      Breath sounds: Normal breath sounds. Abdominal:      General: Bowel sounds are normal.      Palpations: Abdomen is soft. Musculoskeletal:         General: Normal range of motion. Cervical back: Normal range of motion. Skin:     General: Skin is warm. Neurological:      General: No focal deficit present. Mental Status: He is alert and oriented to person, place, and time. Psychiatric:         Mood and Affect: Mood normal.         Behavior: Behavior normal.           Lab Results   Component Value Date    WBC 8.1 10/23/2020    HGB 16.6 10/23/2020    HCT 48.9 10/23/2020    MCV 89 10/23/2020     10/23/2020        Lab Results   Component Value Date     04/24/2021    K 4.4 04/24/2021     04/24/2021    CO2 26 10/23/2020    BUN 17 03/29/2018    CREATININE 1.10 04/24/2021    GLUCOSE 101 10/23/2020    CALCIUM 9.0 04/24/2021    PROT 6.9 04/24/2021    LABALBU 4.3 04/24/2021    BILITOT 0.9 04/24/2021    ALKPHOS 77 04/24/2021    AST 20 04/24/2021    ALT 25 04/24/2021    LABGLOM >60 10/23/2020    GFRAA >60 02/22/2016        No results found for: \"INR\", \"PROTIME\"          Assessment    Mr. Christiano Sanford is a 46year-old man with a past medical history of acid reflux and hyperlipidemia whose colonoscopy on 07/08/2022 led to a findings of a lesion on his IC valve - pathology was consistent with tubulovillous adenoma. He presents for a follow -up colonoscopy. ASA 2, Mallamapati score 2. Plan    Tubulovillous adenoma polyp of colon  - COLONOSCOPY (Screening); Future  - polyethylene glycol-electrolytes (COLYTE) 240 g SOLR solution; Take 4,000 mLs by mouth once for 1 dose  Dispense: 4000 mL; Refill: 0    2. Status post right hemicolectomy  - COLONOSCOPY (Screening); Future  - polyethylene glycol-electrolytes (COLYTE) 240 g SOLR solution; Take 4,000 mLs by mouth once for 1 dose  Dispense: 4000 mL; Refill: 0    3.  Additional recommendations based on findings      Informed consent was

## 2023-10-03 NOTE — H&P (VIEW-ONLY)
[address]    Chief Complaint   Patient presents with    Follow-up     One year follow up rectal mass-surgery to remove. Denies any current GI issues. Pre-visit colonoscopy. HPI    Mr. Christopher Wilcox is a 46year-old man with a past medical history of acid reflux and hyperlipidemia whose colonoscopy on 07/08/2022 showed a lesion in her IV valve which led to a referral to colorectal surgery. He had a right hemicolectomy with an appendectomy at an outside hospital in 08/2022. He presents for a follow-up colonoscopy. Has added fiber to diet  No longer smoking tobacco    Family history of colon cancer: No  Blood in stool: No  Unintentional weight loss: No  Abdominal pain: No  Prior colonoscopy: Yes - 2022. S/P right hemicolectomy. 07/08/2023 Colonoscopy  1. Cecal polypoid sessile lesion - approximately 3cm  2. Ascending colon polyps  3. Descending colon polyp  4. Diverticulosis  5. Rectal polyp     RECOMMENDATIONS:   1) Follow up with General surgery   2) Repeat Colonoscopy in no later than 1 year  3) Recommend extended prep for future colonoscopy    08/26/2022 Pathology  Right hemicolectomy: Tubulovillous adenoma (1.9 x1.4 x 1.0 cm) at the IC valve       Past Medical History:   Diagnosis Date    Colon cancer ([address]) 07/2022    Dr. Sabrina Reddy, Dr. Awan [facility]    DDD (degenerative disc disease), cervical     GERD (gastroesophageal reflux disease)     Hyperlipidemia     Osteoarthritis          Past Surgical History:   Procedure Laterality Date    COLON SURGERY      COLONOSCOPY  03/01/2016    COLONOSCOPY N/A 07/08/2022    COLONOSCOPY POLYPECTOMY SNARE/COLD BIOPSY  with spot tattoe, and eleview.  performed by Rafat Lakhani MD at [address] [facility]  07/08/2022    Dr Fitch-polyps cecum(tubular adenoma),ascending colon(tubular adenoma)rectum(hyperplastic),diverticulosis    LITHOTRIPSY      NECK SURGERY      RIGHT COLECTOMY  08/26/2022    [facility]-Dr.Alammar HEATH

## 2023-10-09 ENCOUNTER — ANESTHESIA EVENT (OUTPATIENT)
Dept: OPERATING ROOM | Age: 48
End: 2023-10-09
Payer: COMMERCIAL

## 2023-10-10 ENCOUNTER — ANESTHESIA EVENT (OUTPATIENT)
Dept: OPERATING ROOM | Age: 48
End: 2023-10-10
Payer: COMMERCIAL

## 2023-10-10 ENCOUNTER — ANESTHESIA (OUTPATIENT)
Dept: OPERATING ROOM | Age: 48
End: 2023-10-10
Payer: COMMERCIAL

## 2023-10-10 ENCOUNTER — HOSPITAL ENCOUNTER (OUTPATIENT)
Age: 48
Setting detail: OUTPATIENT SURGERY
Discharge: HOME | End: 2023-10-10
Attending: INTERNAL MEDICINE | Admitting: INTERNAL MEDICINE
Payer: COMMERCIAL

## 2023-10-10 VITALS
HEART RATE: 57 BPM | HEIGHT: 68 IN | DIASTOLIC BLOOD PRESSURE: 78 MMHG | RESPIRATION RATE: 16 BRPM | OXYGEN SATURATION: 96 % | WEIGHT: 173.8 LBS | BODY MASS INDEX: 26.34 KG/M2 | TEMPERATURE: 97.6 F | SYSTOLIC BLOOD PRESSURE: 119 MMHG

## 2023-10-10 PROCEDURE — 7100000011 HC PHASE II RECOVERY - ADDTL 15 MIN: Performed by: INTERNAL MEDICINE

## 2023-10-10 PROCEDURE — 2580000003 HC RX 258: Performed by: NURSE ANESTHETIST, CERTIFIED REGISTERED

## 2023-10-10 PROCEDURE — 45330 DIAGNOSTIC SIGMOIDOSCOPY: CPT | Performed by: INTERNAL MEDICINE

## 2023-10-10 PROCEDURE — 2500000003 HC RX 250 WO HCPCS: Performed by: NURSE ANESTHETIST, CERTIFIED REGISTERED

## 2023-10-10 PROCEDURE — 3609156700 HC PROCTOSIGMOIDOSCOPY DX: Performed by: INTERNAL MEDICINE

## 2023-10-10 PROCEDURE — 2709999900 HC NON-CHARGEABLE SUPPLY: Performed by: INTERNAL MEDICINE

## 2023-10-10 PROCEDURE — 3700000000 HC ANESTHESIA ATTENDED CARE: Performed by: INTERNAL MEDICINE

## 2023-10-10 PROCEDURE — 6360000002 HC RX W HCPCS: Performed by: NURSE ANESTHETIST, CERTIFIED REGISTERED

## 2023-10-10 PROCEDURE — 7100000010 HC PHASE II RECOVERY - FIRST 15 MIN: Performed by: INTERNAL MEDICINE

## 2023-10-10 RX ORDER — LIDOCAINE HYDROCHLORIDE 20 MG/ML
INJECTION, SOLUTION EPIDURAL; INFILTRATION; INTRACAUDAL; PERINEURAL PRN
Status: DISCONTINUED | OUTPATIENT
Start: 2023-10-10 | End: 2023-10-10 | Stop reason: SDUPTHER

## 2023-10-10 RX ORDER — PROPOFOL 10 MG/ML
INJECTION, EMULSION INTRAVENOUS CONTINUOUS PRN
Status: DISCONTINUED | OUTPATIENT
Start: 2023-10-10 | End: 2023-10-10 | Stop reason: SDUPTHER

## 2023-10-10 RX ORDER — SODIUM CHLORIDE, SODIUM LACTATE, POTASSIUM CHLORIDE, CALCIUM CHLORIDE 600; 310; 30; 20 MG/100ML; MG/100ML; MG/100ML; MG/100ML
INJECTION, SOLUTION INTRAVENOUS CONTINUOUS PRN
Status: DISCONTINUED | OUTPATIENT
Start: 2023-10-10 | End: 2023-10-10 | Stop reason: SDUPTHER

## 2023-10-10 RX ORDER — SODIUM CHLORIDE, SODIUM LACTATE, POTASSIUM CHLORIDE, CALCIUM CHLORIDE 600; 310; 30; 20 MG/100ML; MG/100ML; MG/100ML; MG/100ML
INJECTION, SOLUTION INTRAVENOUS CONTINUOUS
Status: DISCONTINUED | OUTPATIENT
Start: 2023-10-10 | End: 2023-10-10 | Stop reason: HOSPADM

## 2023-10-10 RX ADMIN — SODIUM CHLORIDE, POTASSIUM CHLORIDE, SODIUM LACTATE AND CALCIUM CHLORIDE: 600; 310; 30; 20 INJECTION, SOLUTION INTRAVENOUS at 10:19

## 2023-10-10 RX ADMIN — SODIUM CHLORIDE, POTASSIUM CHLORIDE, SODIUM LACTATE AND CALCIUM CHLORIDE: 600; 310; 30; 20 INJECTION, SOLUTION INTRAVENOUS at 10:53

## 2023-10-10 RX ADMIN — PROPOFOL 150 MCG/KG/MIN: 10 INJECTION, EMULSION INTRAVENOUS at 10:56

## 2023-10-10 RX ADMIN — LIDOCAINE HYDROCHLORIDE 5 ML: 20 INJECTION, SOLUTION EPIDURAL; INFILTRATION; INTRACAUDAL; PERINEURAL at 10:56

## 2023-10-10 NOTE — ANESTHESIA POSTPROCEDURE EVALUATION
Department of Anesthesiology  Postprocedure Note    Patient: Emilie Segovia  MRN: [id number]  YOB: 1975  Date of evaluation: 10/10/2023      Procedure Summary     Date: 10/10/23 Room / Location: [address] [place] [address]    Anesthesia Start: 1053 Anesthesia Stop: 1105    Procedure: ANAL PROCTO SIGMOIDOSCOPY FLEXIBLE Diagnosis:       Screening for colon cancer      (Screening for colon cancer [Z12.11])    Surgeons: Hannah Chavez MD Responsible Provider: MAYELIN Iniguez CRNA    Anesthesia Type: general ASA Status: 2          Anesthesia Type: No value filed.     Yariel Phase I: Yariel Score: 10    Yariel Phase II: Yariel Score: 10      Anesthesia Post Evaluation    Patient location during evaluation: PACU  Patient participation: complete - patient participated  Level of consciousness: awake and alert  Airway patency: patent  Nausea & Vomiting: no nausea and no vomiting  Complications: no  Cardiovascular status: blood pressure returned to baseline and hemodynamically stable  Respiratory status: acceptable and room air  Hydration status: euvolemic  Pain management: adequate

## 2023-10-10 NOTE — OP NOTE
[facility]    Flexible sigmoidoscopy    PROCEDURE DATE: 10/10/23    REFERRING PHYSICIAN: No ref. provider found     PRIMARY CARE PROVIDER: MAYELIN Blanc - CNP    ATTENDING PHYSICIAN: Jaciel Guevara MD     HISTORY: Mr. Marga Scherer is a 46 y.o. male who presents to the  endoscopy unit for colonoscopy. The patient's clinical history is remarkable for tubulovillous adenoma in cecum s/p cecectomy. He is currently medically stable and appropriate for the planned procedure. PREOPERATIVE DIAGNOSIS: screening for colon cancer. PROCEDURES:   Transanal Colonoscopy --screening. POSTPROCEDURE DIAGNOSIS:  Poor prep    MEDICATIONS:     MAC per anesthesia     EBL Nione      INSTRUMENT: Olympus CF-H190 AL Pediatric flexible Colonoscope. PREPARATION: The nature and character of the procedure as well as risks, benefits, and alternatives were discussed with the patient and informed consent was obtained. Complications were said to include, but were not limited to: medication allergy, medication reaction, cardiovascular and respiratory problems, bleeding, perforation, infection, and/or missed diagnosis. Following arrival in the endoscopy room, the patient was placed in the left lateral decubitus position and final time-out accomplished in the presence of the nursing staff. Baseline vital signs were obtained and reviewed, and IV sedation was subsequently initiated. FINDINGS: Rectal examination demonstrated no significant visible external abnormality and digital palpation was unremarkable. Following adequate conscious sedation the colonoscope was introduced and advanced under direct visualization to the descending colon. The bowel preparation was felt to be poor.  The procedure was aborted    IMPRESSION:   Poor prep - brown stool in colon     RECOMMENDATIONS:   1) Repeat Colonoscopy in  24 hours      Electronically signed by Jaciel Guevara MD on 10/10/2023 at 11:16 AM

## 2023-10-10 NOTE — PROGRESS NOTES
Discharge instructions reviewed with patient and patient's wife. Additional instructions for further prep for repeat colonoscopy reviewed also. Both imply understanding.

## 2023-10-10 NOTE — PROGRESS NOTES
Discharge Criteria    Inpatients must meet Criteria 1 through 7. All other patients are either YES or N/A. If a NO is chosen then Anesthesia or Surgeon must be notified. 1.  Minimum 30 minutes after last dose of sedative medication. Yes      2. Systolic BP between 90 - 189. Diastolic BP between 60 - 90. Yes      3. Pulse between 60 - 120    Yes      4. Respirations between 8 - 25. Yes      5. SpO2 92% - 100%. Yes      6. Able to cough and swallow or return to baseline function. Yes      7. Alert and oriented or return to baseline mental status. Yes      8. Demonstrates controlled, coordinated movements, ambulates with steady gait, or return to baseline activity function. Yes      9. Minimal or no pain or nausea, or at a level tolerable and acceptable to patient. Yes      10. Takes and retains oral fluids as allowed. Yes      11. Procedural / perioperative site stable. Minimal or no bleeding. Yes          12. If GI endoscopy procedure, minimal or no abdominal distention or passing flatus. Yes      13. Written discharge instructions and emergency telephone number provided. Yes      14. Accompanied by a responsible adult.     Yes

## 2023-10-10 NOTE — INTERVAL H&P NOTE
Update History & Physical    The patient's History and Physical of October 3, 2023 was reviewed with the patient and I examined the patient. There was no change. The surgical site was confirmed by the patient and me. Plan: The risks, benefits, expected outcome, and alternative to the recommended procedure have been discussed with the patient. Patient understands and wants to proceed with the procedure.      Electronically signed by Melly Urban MD on 10/10/2023 at 10:08 AM

## 2023-10-10 NOTE — ANESTHESIA PRE PROCEDURE
ASA 2       Induction: intravenous. Anesthetic plan and risks discussed with patient and spouse.                         MAYELIN Chapman - CARLITOS   10/10/2023

## 2023-10-10 NOTE — DISCHARGE INSTRUCTIONS
SAME DAY SURGERY DISCHARGE INSTRUCTIONS    1. Do not drive or operate hazardous machinery for 24 hours. 2.  Do not make important personal or business decisions for 24 hours. 3.  Do not drink alcoholic beverages for 24 hours. 4.  Do not smoke tobacco products for 24 hours. 6.  Limit your activities for 24 hours. Do not engage in heavy work until your surgeon gives you permission. 7.  Call your surgeon for any questions regarding your surgery. 8.  Patient should not be left alone for 12-24 hours following surgical procedure. COLONOSCOPY DISCHARGE INSTRUCTIONS:    It's normal to have a feeling of fullness or mild cramping in your abdomen afterwards due to air that is put into your bowel during the procedure. Mild activities such as walking will help you pass the air. CALL THE DOCTOR IF YOU HAVE:     Chest pain or trouble breathing. Bleeding from your rectum, vomiting or spitting up of blood that is more than a few streaks or red or black stools    A fever above 101F or if you have chills    Pain that is worse or different than any pain you had before the procedure    Nausea or vomiting that lasts for more than 2 hours. If symptoms are to severe call 911 or go to the nearest Emergency Room. ***follow the instructions on the \"Gatorade/Miralax bowel prep\" sheet. Arrive tomorrow at 2:15pm for repeat colonoscopy.

## 2023-10-11 ENCOUNTER — ANESTHESIA (OUTPATIENT)
Dept: OPERATING ROOM | Age: 48
End: 2023-10-11
Payer: COMMERCIAL

## 2023-10-11 ENCOUNTER — HOSPITAL ENCOUNTER (OUTPATIENT)
Age: 48
Setting detail: OUTPATIENT SURGERY
Discharge: HOME | End: 2023-10-11
Attending: INTERNAL MEDICINE | Admitting: INTERNAL MEDICINE
Payer: COMMERCIAL

## 2023-10-11 VITALS
TEMPERATURE: 97.9 F | WEIGHT: 173 LBS | HEIGHT: 68 IN | OXYGEN SATURATION: 97 % | DIASTOLIC BLOOD PRESSURE: 68 MMHG | HEART RATE: 69 BPM | RESPIRATION RATE: 16 BRPM | SYSTOLIC BLOOD PRESSURE: 110 MMHG | BODY MASS INDEX: 26.22 KG/M2

## 2023-10-11 DIAGNOSIS — Z12.11 SCREENING FOR COLON CANCER: ICD-10-CM

## 2023-10-11 PROCEDURE — 3609010600 HC COLONOSCOPY POLYPECTOMY SNARE/COLD BIOPSY: Performed by: INTERNAL MEDICINE

## 2023-10-11 PROCEDURE — 45380 COLONOSCOPY AND BIOPSY: CPT | Performed by: INTERNAL MEDICINE

## 2023-10-11 PROCEDURE — 3700000001 HC ADD 15 MINUTES (ANESTHESIA): Performed by: INTERNAL MEDICINE

## 2023-10-11 PROCEDURE — 2500000003 HC RX 250 WO HCPCS: Performed by: NURSE ANESTHETIST, CERTIFIED REGISTERED

## 2023-10-11 PROCEDURE — 7100000010 HC PHASE II RECOVERY - FIRST 15 MIN: Performed by: INTERNAL MEDICINE

## 2023-10-11 PROCEDURE — 2580000003 HC RX 258: Performed by: NURSE ANESTHETIST, CERTIFIED REGISTERED

## 2023-10-11 PROCEDURE — 2709999900 HC NON-CHARGEABLE SUPPLY: Performed by: INTERNAL MEDICINE

## 2023-10-11 PROCEDURE — 7100000011 HC PHASE II RECOVERY - ADDTL 15 MIN: Performed by: INTERNAL MEDICINE

## 2023-10-11 PROCEDURE — 3700000000 HC ANESTHESIA ATTENDED CARE: Performed by: INTERNAL MEDICINE

## 2023-10-11 PROCEDURE — 88305 TISSUE EXAM BY PATHOLOGIST: CPT

## 2023-10-11 PROCEDURE — 6360000002 HC RX W HCPCS: Performed by: NURSE ANESTHETIST, CERTIFIED REGISTERED

## 2023-10-11 RX ORDER — SODIUM CHLORIDE 9 MG/ML
INJECTION, SOLUTION INTRAVENOUS PRN
Status: DISCONTINUED | OUTPATIENT
Start: 2023-10-11 | End: 2023-10-11 | Stop reason: HOSPADM

## 2023-10-11 RX ORDER — LIDOCAINE HYDROCHLORIDE 20 MG/ML
INJECTION, SOLUTION EPIDURAL; INFILTRATION; INTRACAUDAL; PERINEURAL PRN
Status: DISCONTINUED | OUTPATIENT
Start: 2023-10-11 | End: 2023-10-11 | Stop reason: SDUPTHER

## 2023-10-11 RX ORDER — SODIUM CHLORIDE 0.9 % (FLUSH) 0.9 %
5-40 SYRINGE (ML) INJECTION EVERY 12 HOURS SCHEDULED
Status: DISCONTINUED | OUTPATIENT
Start: 2023-10-11 | End: 2023-10-11 | Stop reason: HOSPADM

## 2023-10-11 RX ORDER — PROPOFOL 10 MG/ML
INJECTION, EMULSION INTRAVENOUS PRN
Status: DISCONTINUED | OUTPATIENT
Start: 2023-10-11 | End: 2023-10-11 | Stop reason: SDUPTHER

## 2023-10-11 RX ORDER — SODIUM CHLORIDE 0.9 % (FLUSH) 0.9 %
5-40 SYRINGE (ML) INJECTION PRN
Status: DISCONTINUED | OUTPATIENT
Start: 2023-10-11 | End: 2023-10-11 | Stop reason: HOSPADM

## 2023-10-11 RX ORDER — SODIUM CHLORIDE, SODIUM LACTATE, POTASSIUM CHLORIDE, CALCIUM CHLORIDE 600; 310; 30; 20 MG/100ML; MG/100ML; MG/100ML; MG/100ML
INJECTION, SOLUTION INTRAVENOUS CONTINUOUS
Status: DISCONTINUED | OUTPATIENT
Start: 2023-10-11 | End: 2023-10-11 | Stop reason: HOSPADM

## 2023-10-11 RX ADMIN — LIDOCAINE HYDROCHLORIDE 200 MG: 20 INJECTION, SOLUTION EPIDURAL; INFILTRATION; INTRACAUDAL; PERINEURAL at 14:37

## 2023-10-11 RX ADMIN — PROPOFOL 80 MG: 10 INJECTION, EMULSION INTRAVENOUS at 14:37

## 2023-10-11 RX ADMIN — PROPOFOL 200 MCG/KG/MIN: 10 INJECTION, EMULSION INTRAVENOUS at 14:38

## 2023-10-11 RX ADMIN — SODIUM CHLORIDE, POTASSIUM CHLORIDE, SODIUM LACTATE AND CALCIUM CHLORIDE: 600; 310; 30; 20 INJECTION, SOLUTION INTRAVENOUS at 14:12

## 2023-10-11 RX ADMIN — PHENYLEPHRINE HYDROCHLORIDE 50 MCG: 10 INJECTION INTRAVENOUS at 14:51

## 2023-10-11 ASSESSMENT — PAIN - FUNCTIONAL ASSESSMENT: PAIN_FUNCTIONAL_ASSESSMENT: 0-10

## 2023-10-11 NOTE — INTERVAL H&P NOTE
Update History & Physical    The patient's History and Physical of October 3, 2023 was reviewed with the patient and I examined the patient. There was no change. The surgical site was confirmed by the patient and me. Plan: The risks, benefits, expected outcome, and alternative to the recommended procedure have been discussed with the patient. Patient understands and wants to proceed with the procedure.      Electronically signed by Miguel Theodore MD on 10/11/2023 at 1:55 PM

## 2023-10-11 NOTE — ANESTHESIA POSTPROCEDURE EVALUATION
Department of Anesthesiology  Postprocedure Note    Patient: Jemma Wyatt  MRN: [id number]  YOB: 1975  Date of evaluation: 10/11/2023      Procedure Summary     Date: 10/11/23 Room / Location: [address] [place] [address]    Anesthesia Start: 6346 Anesthesia Stop: 1456    Procedure: COLONOSCOPY POLYPECTOMY SNARE/COLD BIOPSY Diagnosis:       Screening for colon cancer      (Screening for colon cancer [Z12.11])    Surgeons: Issac Buckner MD Responsible Provider: MAYELIN Law CRNA    Anesthesia Type: general ASA Status: 2          Anesthesia Type: No value filed.     Yariel Phase I: Yariel Score: 10    Yariel Phase II: Yariel Score: 9      Anesthesia Post Evaluation    Patient location during evaluation: bedside  Patient participation: complete - patient participated  Level of consciousness: awake and alert  Pain score: 0  Airway patency: patent  Nausea & Vomiting: no nausea and no vomiting  Complications: no  Cardiovascular status: hemodynamically stable  Respiratory status: acceptable  Hydration status: stable  Pain management: adequate

## 2023-10-11 NOTE — FLOWSHEET NOTE
Patient voices readiness for discharge. Instructions reviewed with patient and spouse, VU. Work note given. Discharge Criteria    Inpatients must meet Criteria 1 through 7. All other patients are either YES or N/A. If a NO is chosen then Anesthesia or Surgeon must be notified. 1.  Minimum 30 minutes after last dose of sedative medication. Yes      2. Systolic BP between 90 - 173. Diastolic BP between 60 - 90. Yes      3. Pulse between 60 - 120    Yes      4. Respirations between 8 - 25. Yes      5. SpO2 92% - 100%. Yes      6. Able to cough and swallow or return to baseline function. Yes      7. Alert and oriented or return to baseline mental status. Yes      8. Demonstrates controlled, coordinated movements, ambulates with steady gait, or return to baseline activity function. Yes      9. Minimal or no pain or nausea, or at a level tolerable and acceptable to patient. Yes      10. Takes and retains oral fluids as allowed. Yes      11. Procedural / perioperative site stable. Minimal or no bleeding. Yes          12. If GI endoscopy procedure, minimal or no abdominal distention or passing flatus. Yes      13. Written discharge instructions and emergency telephone number provided. Yes      14. Accompanied by a responsible adult.     Yes

## 2023-10-15 NOTE — OP NOTE
anastomosis with no evidence of masses or lesions. A biopsy Retroflexion was performed in the rectum and mild internal hemorrhoids were noted as well as two sessile 4mm, 4mm rectal polyps removed with cold biopsy forceps. Withdrawal time was 9 minutes. IMPRESSION:   Colon polyps     RECOMMENDATIONS:   1) Follow up with referring provider, as previously scheduled.    2) Repeat Colonoscopy in 3 years       Electronically signed by Corinne Russell, MD on 10/14/2023 at 11:49 PM

## 2023-10-16 LAB — SURGICAL PATHOLOGY REPORT: NORMAL

## 2023-10-24 ENCOUNTER — TELEPHONE (OUTPATIENT)
Dept: GASTROENTEROLOGY | Age: 48
End: 2023-10-24

## 2023-10-24 NOTE — TELEPHONE ENCOUNTER
----- Message from Rafat Lakhani MD sent at 10/24/2023 11:03 AM EDT -----  Please notify patient:Tubular adenomas are on a pre-cancerous spectrum. No cancer was reported. Repeat interval for colonoscopy is 3 years.

## 2025-01-13 ENCOUNTER — OFFICE VISIT (OUTPATIENT)
Dept: SURGERY | Age: 50
End: 2025-01-13
Payer: COMMERCIAL

## 2025-01-13 ENCOUNTER — HOSPITAL ENCOUNTER (OUTPATIENT)
Age: 50
Discharge: HOME | End: 2025-01-13
Payer: COMMERCIAL

## 2025-01-13 VITALS
WEIGHT: 185 LBS | BODY MASS INDEX: 28.13 KG/M2 | SYSTOLIC BLOOD PRESSURE: 117 MMHG | DIASTOLIC BLOOD PRESSURE: 79 MMHG | HEART RATE: 81 BPM

## 2025-01-13 DIAGNOSIS — Z12.11 SCREEN FOR COLON CANCER: ICD-10-CM

## 2025-01-13 DIAGNOSIS — Z12.11 SCREENING FOR COLON CANCER: ICD-10-CM

## 2025-01-13 DIAGNOSIS — Z86.0100 HISTORY OF COLON POLYPS: Primary | ICD-10-CM

## 2025-01-13 DIAGNOSIS — Z12.11 SCREENING FOR COLON CANCER: Primary | ICD-10-CM

## 2025-01-13 LAB
EKG ATRIAL RATE: 65 BPM
EKG P AXIS: 61 DEGREES
EKG P-R INTERVAL: 188 MS
EKG Q-T INTERVAL: 390 MS
EKG QRS DURATION: 90 MS
EKG QTC CALCULATION (BAZETT): 405 MS
EKG R AXIS: 7 DEGREES
EKG T AXIS: 36 DEGREES
EKG VENTRICULAR RATE: 65 BPM

## 2025-01-13 PROCEDURE — 93005 ELECTROCARDIOGRAM TRACING: CPT

## 2025-01-13 PROCEDURE — 93010 ELECTROCARDIOGRAM REPORT: CPT | Performed by: INTERNAL MEDICINE

## 2025-01-13 PROCEDURE — 99213 OFFICE O/P EST LOW 20 MIN: CPT | Performed by: SURGERY

## 2025-01-13 RX ORDER — SODIUM, POTASSIUM,MAG SULFATES 17.5-3.13G
SOLUTION, RECONSTITUTED, ORAL ORAL
Qty: 1 EACH | Refills: 0 | Status: SHIPPED | OUTPATIENT
Start: 2025-01-13

## 2025-01-13 NOTE — PROGRESS NOTES
significant adenopathy. No carotid bruits, thyroid normal size and no masses    Chest: CTA, no wheezes, no rales, no rhonchi, symmetrical    Heart: Normal rate, regular rhythm, no murmurs    Abdomen: Soft, positive bowel sounds, non tender, non distended, no masses,  no HSM, no bruits. Small umbilical hernia. Scars consistent with laparoscopy.    Neuro: Normal speech, motor/sensory grossly normal bilateral    MSK: No joint tenderness, deformity, or swelling           ASSESSMENT:  1) History of colectomy for a tubulovillous adenoma. He last colonoscopy in 10/23 did showed a rectal adenoma and a 3 year repeat colonoscopy was recommended. However, he is very concerned about his risk and wants it done sooner.    PLAN:  1) Colonoscopy - Risks and benefits of colonoscopy were discussed with Oleg Camargo.  In particular I discussed the possibility of incomplete colonoscopy and failure to make a diagnosis.  I also discussed the risks and consequences of reactions to the sedatives, bleeding and perforation.  Alternate ways of evaluating the colon including barium enema, CT colonography, sigmoidoscopy were discussed and when appropriate fecal testing.  he was also given the opportunity to have any questions answered, and encouraged to call the office with additional issues.  -  I did inform him this sooner than recommended, and that excessive colonoscopy have the potential of the risks exceeding the benefit, and his insurance may not cover it.       Electronically signed by JORDAN DE LOS SANTOS MD on 1/13/2025 at 3:45 PM

## 2025-01-13 NOTE — PATIENT INSTRUCTIONS
SURVEY:    You may be receiving a survey from [facility] regarding your visit today.    You may get this in the mail, through your MyChart, or in your email.     Please complete the survey to enable us to provide the highest quality of care to you and your family.    If you cannot score us a very good (5 Stars) on any question, please call the office to discuss how we could of made your experience exceptional.    Thank you!    General Surgery    MD Dr. Fabiola Agrawal, DO Dr. Darrius Funez, MAYELIN Redmond-CNP    Pain Mgmt.  Dr. Janak Alfaro, VENTURA Gonzales LPN Brenda Boehler, LPN Jena Adams, MA Emily Akers, MA    Phone: [phone]  Fax: [phone]    Office Hours:   M-TH 8-5, F: 8-12

## 2025-01-14 PROBLEM — Z12.11 SCREEN FOR COLON CANCER: Status: ACTIVE | Noted: 2025-01-13

## 2025-01-30 RX ORDER — ACETAMINOPHEN 500 MG
500 TABLET ORAL EVERY 6 HOURS PRN
COMMUNITY

## 2025-01-30 NOTE — PROGRESS NOTES
Patient states that received their colon prep instructions and home medications that are to be taken on the day of their procedure with a small sip of water only from the physician's office. NPO status (including no gum, hard candy, mints, water, coffee, or smoking) was reviewed and patient verbalizes understanding. Patient denies any fever related illnesses or antibiotic use for any upper respiratory conditions in the last 4 weeks. They were advised to contact their surgeon's office if they experience any fever, chills, or cold symptoms before their procedure. EKG done 1/13/2025.

## 2025-02-06 ENCOUNTER — ANESTHESIA EVENT (OUTPATIENT)
Dept: OPERATING ROOM | Age: 50
End: 2025-02-06
Payer: COMMERCIAL

## 2025-02-07 ENCOUNTER — HOSPITAL ENCOUNTER (OUTPATIENT)
Age: 50
Setting detail: OUTPATIENT SURGERY
Discharge: HOME | End: 2025-02-07
Attending: SURGERY | Admitting: SURGERY
Payer: COMMERCIAL

## 2025-02-07 ENCOUNTER — ANESTHESIA (OUTPATIENT)
Dept: OPERATING ROOM | Age: 50
End: 2025-02-07
Payer: COMMERCIAL

## 2025-02-07 VITALS
OXYGEN SATURATION: 95 % | WEIGHT: 179.4 LBS | BODY MASS INDEX: 26.57 KG/M2 | TEMPERATURE: 96.9 F | DIASTOLIC BLOOD PRESSURE: 80 MMHG | HEIGHT: 69 IN | RESPIRATION RATE: 14 BRPM | SYSTOLIC BLOOD PRESSURE: 120 MMHG | HEART RATE: 61 BPM

## 2025-02-07 DIAGNOSIS — Z12.11 SCREEN FOR COLON CANCER: ICD-10-CM

## 2025-02-07 PROBLEM — Z86.0101 HISTORY OF ADENOMATOUS AND SERRATED COLON POLYPS: Status: ACTIVE | Noted: 2025-02-07

## 2025-02-07 PROCEDURE — 3609010600 HC COLONOSCOPY POLYPECTOMY SNARE/COLD BIOPSY: Performed by: SURGERY

## 2025-02-07 PROCEDURE — 3700000000 HC ANESTHESIA ATTENDED CARE: Performed by: SURGERY

## 2025-02-07 PROCEDURE — 2580000003 HC RX 258

## 2025-02-07 PROCEDURE — 6360000002 HC RX W HCPCS

## 2025-02-07 PROCEDURE — 45380 COLONOSCOPY AND BIOPSY: CPT | Performed by: SURGERY

## 2025-02-07 PROCEDURE — 3609010300 HC COLONOSCOPY W/BIOPSY SINGLE/MULTIPLE: Performed by: SURGERY

## 2025-02-07 PROCEDURE — 7100000010 HC PHASE II RECOVERY - FIRST 15 MIN: Performed by: SURGERY

## 2025-02-07 PROCEDURE — 88305 TISSUE EXAM BY PATHOLOGIST: CPT

## 2025-02-07 PROCEDURE — 7100000011 HC PHASE II RECOVERY - ADDTL 15 MIN: Performed by: SURGERY

## 2025-02-07 PROCEDURE — 3700000001 HC ADD 15 MINUTES (ANESTHESIA): Performed by: SURGERY

## 2025-02-07 PROCEDURE — 2709999900 HC NON-CHARGEABLE SUPPLY: Performed by: SURGERY

## 2025-02-07 RX ORDER — NALOXONE HYDROCHLORIDE 0.4 MG/ML
INJECTION, SOLUTION INTRAMUSCULAR; INTRAVENOUS; SUBCUTANEOUS PRN
Status: DISCONTINUED | OUTPATIENT
Start: 2025-02-07 | End: 2025-02-07 | Stop reason: HOSPADM

## 2025-02-07 RX ORDER — SODIUM CHLORIDE 0.9 % (FLUSH) 0.9 %
5-40 SYRINGE (ML) INJECTION EVERY 12 HOURS SCHEDULED
Status: DISCONTINUED | OUTPATIENT
Start: 2025-02-07 | End: 2025-02-07 | Stop reason: HOSPADM

## 2025-02-07 RX ORDER — LIDOCAINE HYDROCHLORIDE 20 MG/ML
INJECTION, SOLUTION EPIDURAL; INFILTRATION; INTRACAUDAL; PERINEURAL
Status: DISCONTINUED | OUTPATIENT
Start: 2025-02-07 | End: 2025-02-07 | Stop reason: SDUPTHER

## 2025-02-07 RX ORDER — SODIUM CHLORIDE 9 MG/ML
INJECTION, SOLUTION INTRAVENOUS CONTINUOUS
Status: DISCONTINUED | OUTPATIENT
Start: 2025-02-07 | End: 2025-02-07 | Stop reason: HOSPADM

## 2025-02-07 RX ORDER — ONDANSETRON 2 MG/ML
4 INJECTION INTRAMUSCULAR; INTRAVENOUS
Status: DISCONTINUED | OUTPATIENT
Start: 2025-02-07 | End: 2025-02-07 | Stop reason: HOSPADM

## 2025-02-07 RX ORDER — SODIUM CHLORIDE 0.9 % (FLUSH) 0.9 %
5-40 SYRINGE (ML) INJECTION PRN
Status: DISCONTINUED | OUTPATIENT
Start: 2025-02-07 | End: 2025-02-07 | Stop reason: HOSPADM

## 2025-02-07 RX ORDER — SODIUM CHLORIDE 9 MG/ML
INJECTION, SOLUTION INTRAVENOUS PRN
Status: DISCONTINUED | OUTPATIENT
Start: 2025-02-07 | End: 2025-02-07 | Stop reason: HOSPADM

## 2025-02-07 RX ORDER — PROPOFOL 10 MG/ML
INJECTION, EMULSION INTRAVENOUS
Status: DISCONTINUED | OUTPATIENT
Start: 2025-02-07 | End: 2025-02-07 | Stop reason: SDUPTHER

## 2025-02-07 RX ADMIN — PROPOFOL 20 MG: 10 INJECTION, EMULSION INTRAVENOUS at 10:17

## 2025-02-07 RX ADMIN — LIDOCAINE HYDROCHLORIDE 100 MG: 20 INJECTION, SOLUTION EPIDURAL; INFILTRATION; INTRACAUDAL; PERINEURAL at 10:11

## 2025-02-07 RX ADMIN — PROPOFOL 20 MG: 10 INJECTION, EMULSION INTRAVENOUS at 10:20

## 2025-02-07 RX ADMIN — PROPOFOL 50 MG: 10 INJECTION, EMULSION INTRAVENOUS at 10:18

## 2025-02-07 RX ADMIN — PROPOFOL 60 MG: 10 INJECTION, EMULSION INTRAVENOUS at 10:13

## 2025-02-07 RX ADMIN — PROPOFOL 20 MG: 10 INJECTION, EMULSION INTRAVENOUS at 10:23

## 2025-02-07 RX ADMIN — SODIUM CHLORIDE: 9 INJECTION, SOLUTION INTRAVENOUS at 08:48

## 2025-02-07 RX ADMIN — PROPOFOL 20 MG: 10 INJECTION, EMULSION INTRAVENOUS at 10:22

## 2025-02-07 RX ADMIN — PROPOFOL 20 MG: 10 INJECTION, EMULSION INTRAVENOUS at 10:14

## 2025-02-07 ASSESSMENT — PAIN - FUNCTIONAL ASSESSMENT: PAIN_FUNCTIONAL_ASSESSMENT: 0-10

## 2025-02-07 ASSESSMENT — PAIN SCALES - GENERAL: PAINLEVEL_OUTOF10: 0

## 2025-02-07 ASSESSMENT — LIFESTYLE VARIABLES: SMOKING_STATUS: 0

## 2025-02-07 NOTE — DISCHARGE INSTRUCTIONS
SAME DAY SURGERY DISCHARGE INSTRUCTIONS    1.  Do not drive or operate hazardous machinery for 24 hours.    2.  Do not make important personal or business decisions for 24 hours.    3.  Do not drink alcoholic beverages for 24 hours.    4.  Do not smoke tobacco products for 24 hours.    5.  Eat light foods (Jell-O, soups, etc....) and drink plenty of fluids (water, Sprite, etc...) up to 8 glasses per day, as you can tolerate.    6.  Limit your activities for 24 hours.  Do not engage in heavy work until your surgeon gives you permission.      7.  Call your surgeon for any questions regarding your surgery.    8.  Patient should not be left alone for 12-24 hours following surgical procedure.    COLONOSCOPY DISCHARGE INSTRUCTIONS:    It's normal to have a feeling of fullness or mild cramping in your abdomen afterwards due to air that is put into your bowel during the procedure.  Mild activities such as walking will help you pass the air.    You may resume your regular diet.      CALL THE DOCTOR IF YOU HAVE:     Chest pain or trouble breathing.    Bleeding from your rectum, vomiting or spitting up of blood that is more than a few streaks or red or black stools    A fever above 101F or if you have chills    Pain that is worse or different than any pain you had before the procedure    Nausea or vomiting that lasts for more than 2 hours.        If symptoms are to severe call 911 or go to the nearest Emergency Room.      1) You had a small rectal polyp which was removed, and an irregular polyp-like area at your anastomosis with was also biopsied.  2) You will be called with the biopsy results and recommendations.  Most like repeat colonoscopy in 5 years.  3) Follow up in the office as desired.      Discharge Instructions for Colonoscopy     Colonoscopy is a visual exam of the lining of the large intestine, also called the bowel or colon, with a colonoscope. A colonoscope is a flexible tube with a light and a viewing device.

## 2025-02-07 NOTE — ANESTHESIA PRE PROCEDURE
Airway: Mallampati: I  TM distance: >3 FB   Neck ROM: full  Comment: full beard  Mouth opening: > = 3 FB   Dental:    (+) upper dentures      Pulmonary:Negative Pulmonary ROS and normal exam        (-) not a current smoker                           Cardiovascular:  Exercise tolerance: good (>4 METS)  (+) hyperlipidemia                  Neuro/Psych:   Negative Neuro/Psych ROS              GI/Hepatic/Renal:   (+) bowel prep     (-) GERD      ROS comment: GERD resolved.   Endo/Other:    (+) : arthritis: OA..                 Abdominal: normal exam            Vascular: negative vascular ROS.         Other Findings:         Anesthesia Plan      general and TIVA     ASA 2       Induction: intravenous.      Anesthetic plan and risks discussed with patient and spouse.      Plan discussed with CRNA.                  MAYELIN Gutiérrez - CRNA   2/7/2025

## 2025-02-07 NOTE — PROGRESS NOTES
Discharge Criteria    Inpatients must meet Criteria 1 through 7. All other patients are either YES or N/A. If a NO is chosen then Anesthesia or Surgeon must be notified.      1.  Minimum 30 minutes after last dose of sedative medication.    Yes      2.  Systolic BP between 90 - 160. Diastolic BP between 60 - 90.    Yes      3.  Pulse between 60 - 120    Yes      4.  Respirations between 8 - 25.    Yes      5.  SpO2 92% - 100%.    Yes      6.  Able to cough and swallow or return to baseline function.    Yes      7.  Alert and oriented or return to baseline mental status.    Yes      8.  Demonstrates controlled, coordinated movements, ambulates with steady gait, or return to baseline activity function.    Yes      9.  Minimal or no pain or nausea, or at a level tolerable and acceptable to patient.    Yes      10. Takes and retains oral fluids as allowed.    No-refused      11. Procedural / perioperative site stable.  Minimal or no bleeding.    Yes          12. If GI endoscopy procedure, minimal or no abdominal distention or passing flatus.    Yes      13. Written discharge instructions and emergency telephone number provided.    Yes      14. Accompanied by a responsible adult.    Yes

## 2025-02-07 NOTE — ANESTHESIA POSTPROCEDURE EVALUATION
Department of Anesthesiology  Postprocedure Note    Patient: Oleg Camargo  MRN: [id number]  YOB: 1975  Date of evaluation: 2/7/2025    Procedure Summary       Date: 02/07/25 Room / Location: [place] [address] / [facility]    Anesthesia Start: 1005 Anesthesia Stop: 1034    Procedure: COLONOSCOPY POLYPECTOMY /BIOPSY Diagnosis:       Screen for colon cancer      (Screen for colon cancer [Z12.11])    Surgeons: Jarett Saunders MD Responsible Provider: Audelia Thomason APRN - CRNA    Anesthesia Type: General, TIVA ASA Status: 2            Anesthesia Type: General, TIVA    Yariel Phase I: Yariel Score: 10    Yariel Phase II: Yariel Score: 10    Anesthesia Post Evaluation    Patient location during evaluation: PACU  Patient participation: complete - patient participated  Level of consciousness: awake and alert  Airway patency: patent  Nausea & Vomiting: no nausea and no vomiting  Cardiovascular status: blood pressure returned to baseline and hemodynamically stable  Respiratory status: acceptable and room air  Hydration status: euvolemic  Pain management: adequate        No notable events documented.

## 2025-02-07 NOTE — H&P
Name:  Oleg Camargo  Age:  50 y.o.   :  1975    Physician: JORDAN DE LOS SANTOS MD       Chief Complaint: History of adenomatous polyps      HPI:  Patient requests colonoscopy. Last colonoscopy 10/4/23 showed some adenomatous changes in his rectum on biopsy. In  he had a large polyp in his cecum.  Biopsies showed adenoma. It is variously documented in his chart that is was a large tubulovillous adenoma or colon cancer.  He underwent a right colectomy in [facility]. Pathology under media says tubulovillous adenoma.  I am not sure he had  colon cancer.   In 2016 he had sigmoid adenoma, and tubulovillous adenoma of the rectum.    He denies any abdominal pain or rectal bleeding. No family history of colon cancer or colon polyps.    MEDICAL HISTORY:    Past Medical History:        Diagnosis Date    DDD (degenerative disc disease), cervical     GERD (gastroesophageal reflux disease)     Hyperlipidemia     Osteoarthritis     Tubulovillous adenoma polyp of colon 2022    Dr. Fitch, Dr. Pena [facility]       Past Surgical History:        Procedure Laterality Date    COLON SURGERY      COLONOSCOPY  2016    COLONOSCOPY N/A 2022    COLONOSCOPY POLYPECTOMY SNARE/COLD BIOPSY  with spot tattoe, and eleview. performed by Mariam Fitch MD at [facility] OR    COLONOSCOPY N/A 10/11/2023    COLONOSCOPY POLYPECTOMY SNARE/COLD BIOPSY performed by Mariam Fitch MD at [facility] OR    COLONOSCOPY  10/11/2023    -polyp(adenoma)    COLONOSCOPY W/ BIOPSIES  2022    Dr Fitch-polyps cecum(tubular adenoma),ascending colon(tubular adenoma)rectum(hyperplastic),diverticulosis    LITHOTRIPSY      NECK SURGERY      PROCTOSIGMOIDOSCOPY N/A 10/10/2023    ANAL PROCTO SIGMOIDOSCOPY FLEXIBLE performed by Mariam Fitch MD at [facility] OR    RIGHT COLECTOMY  2022    [facility]-    UPPER GASTROINTESTINAL ENDOSCOPY      UPPER GASTROINTESTINAL ENDOSCOPY  2016    VASECTOMY         Prior to Admission medications    Medication

## 2025-02-10 LAB — SURGICAL PATHOLOGY REPORT: NORMAL

## 2025-02-11 ENCOUNTER — TELEPHONE (OUTPATIENT)
Dept: SURGERY | Age: 50
End: 2025-02-11

## 2025-02-11 NOTE — TELEPHONE ENCOUNTER
----- Message from Dr. Jarett Saunders MD sent at 2/10/2025  3:07 PM EST -----  Hyperplastic polyp and normal tissue.  Repeat colonoscopy in 5 years.

## 2025-02-11 NOTE — TELEPHONE ENCOUNTER
Patient made aware of pathology results and recommendations. Patient voiced understanding and says he will repeat his scope in 3 years.

## (undated) DEVICE — CANNULA ORAL NSL AD CO2 N INTUB O2 DEL DISP TRU LNK

## (undated) DEVICE — TUBING SUCT NON-STRL 9/32X100 W/CNNT

## (undated) DEVICE — SOLUTION IRRIG 1000ML 0.9% SOD CHL USP POUR PLAS BTL

## (undated) DEVICE — SOLUTION IV IRRIG POUR BRL 0.9% SODIUM CHL 2F7124

## (undated) DEVICE — FORCEPS BX JUMBO L240CM DIA2.8MM WRK CHN 3.2MM ORNG W/ NDL

## (undated) DEVICE — TRAP SURG QUAD PARABOLA SLOT DSGN SFTY SCRN TRAPEASE

## (undated) DEVICE — Device: Brand: SPOT EX ENDOSCOPIC TATTOO

## (undated) DEVICE — FORCEPS BX L240CM JAW DIA2.8MM L CAP W/ NDL MIC MESH TOOTH

## (undated) DEVICE — PAD ADH AD ELECTRD 2 PLT W 5M CRD

## (undated) DEVICE — NEEDLE 25GAX5.0MM INJ CARR LOCKE

## (undated) DEVICE — ACUSNARE POLYPECTOMY SNARE: Brand: ACUSNARE

## (undated) DEVICE — COLONOSCOPE ENDOSCP PED M DIA11MM BLU DISP ENDOCUFF VISN

## (undated) DEVICE — FORCEPS BX L240CM JAW DIA2.4MM ORNG L CAP W/ NDL DISP RAD